# Patient Record
(demographics unavailable — no encounter records)

---

## 2024-12-03 NOTE — DISCUSSION/SUMMARY
[FreeTextEntry1] : 22 y/o P0 presents to clinic for depo.  #HCM - Annual + pap smear next visit -Gardasil # 1 administered today  #Depo - received depo shot today, -RTC 3 months.

## 2024-12-03 NOTE — HISTORY OF PRESENT ILLNESS
[FreeTextEntry1] : 20 y/o P0 LMP  presents to clinic for depo. She is happy with this form of contraception. We discussed Gardasil, patient would like to start series today. She is in a rush to get to work, declining annual exam today, will come back next visit for annual.  OBHx:  SABx1 no D+C R tubal ectopic, R salpingectomy  GynHx: Denies h/o fibroids, cysts, abnormal paps, or STIs biopsy proven endometriosis

## 2025-02-25 NOTE — DISCUSSION/SUMMARY
[FreeTextEntry1] : 20 y/o P0 presents to clinic for depo and annual exam  #HCM - f/u pap smear -f/u STI testing -Gardasil # 2 administered today  #Depo - received depo shot today, -RTC 3 months.

## 2025-02-25 NOTE — HISTORY OF PRESENT ILLNESS
[FreeTextEntry1] : 20 y/o P0 LMP 1/3 presents to clinic for depo. She is happy with this form of contraception, on time, will continue. Started Gardasil series last visit, shot #2 today. She denies any abnormal vaginal discharge or bleeding. She is sexually active, would like STi testing.  OBHx:  SABx1 no D+C R tubal ectopic, R salpingectomy  GynHx: Denies h/o fibroids, cysts, abnormal paps, or STIs biopsy proven endometriosis

## 2025-05-13 NOTE — PLAN
[FreeTextEntry1] : 20 y/o P0 presents to clinic for DMPA injection - DMPA given - RTC in 3 months for next DMPA and gardasil #3

## 2025-05-13 NOTE — HISTORY OF PRESENT ILLNESS
[FreeTextEntry1] : 22yo P0 LMP 3/2025, presents to clinic for DMPA injections. Patient is happy with this form of contraception and desires to continue. On time for injections, last shot 2/25/25. Denies any other complaints.

## 2025-07-29 NOTE — DISCUSSION/SUMMARY
[FreeTextEntry1] : 22y/o G0 LMP 6/2/25 presents for DMPA and Gardasil #3. #FP -DMPA shot today (please see RN note for Lot and Exp) -RTC in 3 weeks for next DMPA dose  #HCM -Gardasil #3 shot today (please see RN note for Lot and Exp) -RTC for annual

## 2025-07-29 NOTE — HISTORY OF PRESENT ILLNESS
[FreeTextEntry1] : 20y/o LMP 6/3/25 presents for DMPA and Gardasil #3.  Pt feels well today, has no complaints, is happy with the method and would like to continue.  Pt reports irregular light menses on DMPA.